# Patient Record
Sex: MALE | URBAN - METROPOLITAN AREA
[De-identification: names, ages, dates, MRNs, and addresses within clinical notes are randomized per-mention and may not be internally consistent; named-entity substitution may affect disease eponyms.]

---

## 2022-09-07 ENCOUNTER — HOME HEALTH ADMISSION (OUTPATIENT)
Dept: HOME HEALTH SERVICES | Facility: HOME HEALTH | Age: 87
End: 2022-09-07
Payer: MEDICARE

## 2022-09-19 ENCOUNTER — HOME CARE VISIT (OUTPATIENT)
Dept: SCHEDULING | Facility: HOME HEALTH | Age: 87
End: 2022-09-19
Payer: MEDICARE

## 2022-09-19 VITALS
SYSTOLIC BLOOD PRESSURE: 154 MMHG | TEMPERATURE: 97.6 F | HEART RATE: 82 BPM | RESPIRATION RATE: 18 BRPM | OXYGEN SATURATION: 96 % | DIASTOLIC BLOOD PRESSURE: 86 MMHG

## 2022-09-19 PROCEDURE — G0299 HHS/HOSPICE OF RN EA 15 MIN: HCPCS

## 2022-09-19 ASSESSMENT — ENCOUNTER SYMPTOMS
COUGH CHARACTERISTICS: NON-PRODUCTIVE
DYSPNEA ACTIVITY LEVEL: AFTER AMBULATING MORE THAN 20 FT
PAIN LOCATION - PAIN QUALITY: ACHES
COUGH: 1

## 2022-09-24 PROCEDURE — 400014 HH F/U

## 2022-09-24 PROCEDURE — 1090000001 HH PPS REVENUE CREDIT

## 2022-09-24 PROCEDURE — 1090000002 HH PPS REVENUE DEBIT

## 2022-09-25 PROCEDURE — 1090000002 HH PPS REVENUE DEBIT

## 2022-09-25 PROCEDURE — 1090000001 HH PPS REVENUE CREDIT

## 2022-09-26 PROCEDURE — 1090000002 HH PPS REVENUE DEBIT

## 2022-09-26 PROCEDURE — 1090000001 HH PPS REVENUE CREDIT

## 2022-09-27 PROCEDURE — 1090000001 HH PPS REVENUE CREDIT

## 2022-09-27 PROCEDURE — 1090000002 HH PPS REVENUE DEBIT

## 2022-09-28 ENCOUNTER — HOME CARE VISIT (OUTPATIENT)
Dept: HOME HEALTH SERVICES | Facility: HOME HEALTH | Age: 87
End: 2022-09-28
Payer: MEDICARE

## 2022-09-28 PROCEDURE — 1090000001 HH PPS REVENUE CREDIT

## 2022-09-28 PROCEDURE — 1090000002 HH PPS REVENUE DEBIT

## 2022-09-28 ASSESSMENT — ENCOUNTER SYMPTOMS: HEMOPTYSIS: 0

## 2022-09-29 PROCEDURE — 1090000001 HH PPS REVENUE CREDIT

## 2022-09-29 PROCEDURE — 1090000002 HH PPS REVENUE DEBIT

## 2022-09-30 PROCEDURE — 1090000002 HH PPS REVENUE DEBIT

## 2022-09-30 PROCEDURE — 1090000001 HH PPS REVENUE CREDIT

## 2022-10-01 PROCEDURE — 1090000001 HH PPS REVENUE CREDIT

## 2022-10-01 PROCEDURE — 1090000002 HH PPS REVENUE DEBIT

## 2022-10-02 PROCEDURE — 1090000002 HH PPS REVENUE DEBIT

## 2022-10-02 PROCEDURE — 1090000001 HH PPS REVENUE CREDIT

## 2022-10-03 PROCEDURE — 1090000001 HH PPS REVENUE CREDIT

## 2022-10-03 PROCEDURE — 1090000002 HH PPS REVENUE DEBIT

## 2022-10-04 PROCEDURE — 1090000001 HH PPS REVENUE CREDIT

## 2022-10-04 PROCEDURE — 1090000002 HH PPS REVENUE DEBIT

## 2022-10-05 PROCEDURE — 1090000002 HH PPS REVENUE DEBIT

## 2022-10-05 PROCEDURE — 1090000001 HH PPS REVENUE CREDIT

## 2022-10-06 PROCEDURE — 1090000001 HH PPS REVENUE CREDIT

## 2022-10-06 PROCEDURE — 1090000002 HH PPS REVENUE DEBIT

## 2022-10-07 PROCEDURE — 1090000001 HH PPS REVENUE CREDIT

## 2022-10-07 PROCEDURE — 1090000002 HH PPS REVENUE DEBIT

## 2022-10-08 PROCEDURE — 1090000001 HH PPS REVENUE CREDIT

## 2022-10-08 PROCEDURE — 1090000002 HH PPS REVENUE DEBIT

## 2022-10-09 PROCEDURE — 1090000001 HH PPS REVENUE CREDIT

## 2022-10-09 PROCEDURE — 1090000002 HH PPS REVENUE DEBIT

## 2022-10-10 PROCEDURE — 1090000001 HH PPS REVENUE CREDIT

## 2022-10-10 PROCEDURE — 1090000002 HH PPS REVENUE DEBIT

## 2022-10-11 PROCEDURE — 1090000001 HH PPS REVENUE CREDIT

## 2022-10-11 PROCEDURE — 1090000002 HH PPS REVENUE DEBIT

## 2022-10-12 PROCEDURE — 1090000001 HH PPS REVENUE CREDIT

## 2022-10-12 PROCEDURE — 1090000002 HH PPS REVENUE DEBIT

## 2022-10-13 PROCEDURE — 1090000001 HH PPS REVENUE CREDIT

## 2022-10-13 PROCEDURE — 1090000002 HH PPS REVENUE DEBIT

## 2022-10-14 PROCEDURE — 1090000002 HH PPS REVENUE DEBIT

## 2022-10-14 PROCEDURE — 1090000001 HH PPS REVENUE CREDIT

## 2022-10-15 PROCEDURE — 1090000001 HH PPS REVENUE CREDIT

## 2022-10-15 PROCEDURE — 1090000002 HH PPS REVENUE DEBIT

## 2022-10-16 PROCEDURE — 1090000001 HH PPS REVENUE CREDIT

## 2022-10-16 PROCEDURE — 1090000002 HH PPS REVENUE DEBIT

## 2022-10-17 PROCEDURE — 1090000002 HH PPS REVENUE DEBIT

## 2022-10-17 PROCEDURE — 1090000001 HH PPS REVENUE CREDIT

## 2022-10-18 ENCOUNTER — HOME CARE VISIT (OUTPATIENT)
Dept: SCHEDULING | Facility: HOME HEALTH | Age: 87
End: 2022-10-18
Payer: MEDICARE

## 2022-10-18 VITALS
TEMPERATURE: 97.8 F | RESPIRATION RATE: 20 BRPM | DIASTOLIC BLOOD PRESSURE: 86 MMHG | OXYGEN SATURATION: 92 % | HEART RATE: 72 BPM | SYSTOLIC BLOOD PRESSURE: 146 MMHG

## 2022-10-18 PROCEDURE — G0299 HHS/HOSPICE OF RN EA 15 MIN: HCPCS

## 2022-10-18 PROCEDURE — 1090000001 HH PPS REVENUE CREDIT

## 2022-10-18 PROCEDURE — 400014 HH F/U

## 2022-10-18 PROCEDURE — 1090000002 HH PPS REVENUE DEBIT

## 2022-10-18 ASSESSMENT — ENCOUNTER SYMPTOMS
CONSTIPATION: 1
DYSPNEA ACTIVITY LEVEL: AFTER AMBULATING MORE THAN 20 FT
COUGH CHARACTERISTICS: DRY
COUGH: 1

## 2022-10-18 NOTE — HOME HEALTH
Problem: Routine Urine Catheter change    Intervention: Catheter CDI, urine clear, yellow, below waist, no dependent loops, remined to empty less than 1/2 full, mcginnis care/new catheter per POC, tolerated well. Medications updated per [facility], had recent cough/dry non-productive, on Steroids, Nebs, lungs clear, VS/SATS WNL. Instructed on medications/disease management, safety/fall/infection/bleeding/Mcginnis precautions, CDB, and plan of care needs reinforcement. Goal: Patient will be safe at home free from falls/injury/infection, free from complications of mcginnis cath/bleeding, cough resolved.

## 2022-10-19 PROCEDURE — 1090000002 HH PPS REVENUE DEBIT

## 2022-10-19 PROCEDURE — 1090000001 HH PPS REVENUE CREDIT

## 2022-10-20 PROCEDURE — 1090000002 HH PPS REVENUE DEBIT

## 2022-10-20 PROCEDURE — 1090000001 HH PPS REVENUE CREDIT

## 2022-10-21 PROCEDURE — 1090000001 HH PPS REVENUE CREDIT

## 2022-10-21 PROCEDURE — 1090000002 HH PPS REVENUE DEBIT

## 2022-10-22 PROCEDURE — 1090000002 HH PPS REVENUE DEBIT

## 2022-10-22 PROCEDURE — 1090000001 HH PPS REVENUE CREDIT

## 2022-10-23 PROCEDURE — 1090000003 HH PPS REV ADJ

## 2022-10-23 PROCEDURE — 1090000001 HH PPS REVENUE CREDIT

## 2022-10-23 PROCEDURE — 1090000002 HH PPS REVENUE DEBIT

## 2022-11-15 ENCOUNTER — HOME CARE VISIT (OUTPATIENT)
Dept: HOME HEALTH SERVICES | Facility: HOME HEALTH | Age: 87
End: 2022-11-15
Payer: MEDICARE

## 2022-11-22 ENCOUNTER — HOME CARE VISIT (OUTPATIENT)
Dept: SCHEDULING | Facility: HOME HEALTH | Age: 87
End: 2022-11-22

## 2022-11-22 VITALS
HEART RATE: 79 BPM | SYSTOLIC BLOOD PRESSURE: 138 MMHG | DIASTOLIC BLOOD PRESSURE: 86 MMHG | OXYGEN SATURATION: 92 % | TEMPERATURE: 97.9 F | RESPIRATION RATE: 18 BRPM

## 2022-11-22 PROCEDURE — G0299 HHS/HOSPICE OF RN EA 15 MIN: HCPCS

## 2022-11-22 ASSESSMENT — ENCOUNTER SYMPTOMS: DYSPNEA ACTIVITY LEVEL: AFTER AMBULATING MORE THAN 20 FT

## 2022-11-22 NOTE — HOME HEALTH
Problem: Mcginnis catheter/Urine retention    Intervention: Patient seen for [place] [id number] SN recertification/Mcginnis change. Routine mcginnis change/assessment, unrine yellow/hazy/sediment, insertion site CDI, denies any discomfort or complications, Mcginnis changed in sterile fashion, for return for clear yellow urine, tolerated well, secured to right thigh, to leg back with straps, below waist, no dependent loops, instructed to empty less than 1/2 full, mcginnis care/infection prevention, needs reinforcement. VS/SATS WNL, lungs clear, at baseline, instructed on COPD/energy conservation, cough deep breathing, needs reinforcement. Instructed on medications/disease management, safety/fall/pressure injury/infection/bleeding/COPD precautions and plan of care, needs reinforcement. Goal: Patient will be safe at home free from falls/injury/infection, free from complications of bleeding/mcginnis catheter/COPD.

## 2022-11-30 ENCOUNTER — HOME CARE VISIT (OUTPATIENT)
Dept: HOME HEALTH SERVICES | Facility: HOME HEALTH | Age: 87
End: 2022-11-30

## 2022-11-30 PROCEDURE — G0299 HHS/HOSPICE OF RN EA 15 MIN: HCPCS

## 2022-12-01 VITALS
SYSTOLIC BLOOD PRESSURE: 110 MMHG | OXYGEN SATURATION: 92 % | DIASTOLIC BLOOD PRESSURE: 60 MMHG | RESPIRATION RATE: 18 BRPM | HEART RATE: 73 BPM | TEMPERATURE: 97.3 F

## 2022-12-01 ASSESSMENT — ENCOUNTER SYMPTOMS: DYSPNEA ACTIVITY LEVEL: AFTER AMBULATING MORE THAN 20 FT

## 2022-12-01 NOTE — HOME HEALTH
Problem: PRN visit for RLE wound assessment/Mcginnis catheter. RLE dry/red skin irritation from adhesive device, not open dry/flaky, moisturize skin/cover with bandage, no further interventions required. Intervention: [facility] SN routine visit, Mcginnis CDI, draining clear yellow urine, below waist, new leg strap securement to LLE, no dependent loops, reminded to empty less than 1/2 full, mcginnis care/infection prevention, needs reinforcement. Wound as noted above no further interventions needed, avoid adhesive R/T skin irritation, use leg strap for mcginnis securement. Instructed on medications/risk for bleeding/disease management, safety/fall/pressure injury/Mcginnis/bleeding precautions, Mcginnis care, and plan of care, needs reinforcement. Goal: Patient will be safe at home free from falls/injury/infection, Mcginnis intact without complications.

## 2022-12-20 ENCOUNTER — HOME CARE VISIT (OUTPATIENT)
Dept: SCHEDULING | Facility: HOME HEALTH | Age: 87
End: 2022-12-20
Payer: MEDICARE

## 2022-12-20 VITALS
OXYGEN SATURATION: 94 % | SYSTOLIC BLOOD PRESSURE: 102 MMHG | RESPIRATION RATE: 18 BRPM | DIASTOLIC BLOOD PRESSURE: 60 MMHG | HEART RATE: 68 BPM | TEMPERATURE: 97.6 F

## 2022-12-20 PROCEDURE — G0299 HHS/HOSPICE OF RN EA 15 MIN: HCPCS

## 2022-12-22 ENCOUNTER — HOME CARE VISIT (OUTPATIENT)
Dept: HOME HEALTH SERVICES | Facility: HOME HEALTH | Age: 87
End: 2022-12-22
Payer: MEDICARE

## 2022-12-22 ASSESSMENT — ENCOUNTER SYMPTOMS: DYSPNEA ACTIVITY LEVEL: AFTER AMBULATING MORE THAN 20 FT

## 2022-12-22 NOTE — HOME HEALTH
Problem: [facility] requested PRN visit for Leg bag change, Waggoner CDI, draining, clear yellow urine, secured to leg, below waist, no dependent loops, reminded to empty less than 1/2 full, free from S/S of infection. New open wound to RLE shin/calf, 9x3x0.1, pink/white base, moderate serous drainage, no redness, or heat, changed socks as they were wet. Intervention: [facility] SN wound care per POC tolerated well, instructed on wound care/infection prevention, diet to promote healing, needs reinforcement. Will begin SN visits for wound care 2x weekly/PRN. Waggoner intact WNL, leg bag changed, plan Waggoner change next week. VS/SATS WNL, voices no further complaints at this time. Goal: Patient will be safe at home free from falls/injury/infection, wound healed, free from complications of Waggoner catheter/risk for bleeding.

## 2022-12-23 ENCOUNTER — HOME CARE VISIT (OUTPATIENT)
Dept: SCHEDULING | Facility: HOME HEALTH | Age: 87
End: 2022-12-23
Payer: MEDICARE

## 2022-12-23 VITALS
RESPIRATION RATE: 16 BRPM | SYSTOLIC BLOOD PRESSURE: 126 MMHG | OXYGEN SATURATION: 97 % | HEART RATE: 64 BPM | DIASTOLIC BLOOD PRESSURE: 70 MMHG | TEMPERATURE: 98.1 F

## 2022-12-23 PROCEDURE — G0300 HHS/HOSPICE OF LPN EA 15 MIN: HCPCS

## 2022-12-23 ASSESSMENT — ENCOUNTER SYMPTOMS
HEMOPTYSIS: 0
BOWEL INCONTINENCE: 1

## 2022-12-23 NOTE — HOME HEALTH
Patient with wound, mobility and cognitive impairment   wound care provided per md order, patient toelrated well   Caregiver involvement: resides in [facility]   Patient education provided this visit: SN educated patient and patient's caregiver on fall prevention techniques and signs and symptoms of infection. Patient and patient caregiver verbalizes understanding via the teach back method.    Progress toward goals: progressing well client alert and oriented time 3, vital signs within normal limits, client denies pain, wound care was provided to right lower extremity,  scant serousanginous drainage noted, no signs or symptoms of infection, patient tolerated well, trace edema noted, mcginnis catheter patent, no issues reported mcginnis patent, lung sounds were clear to auscultation, no cough or shortness of breath was noted, fall precautions were maintained   Plan for next visit: wound care   discharge planning was discussed with the patient/ patient's caregiver: DC when goals met

## 2022-12-28 ENCOUNTER — HOME CARE VISIT (OUTPATIENT)
Dept: SCHEDULING | Facility: HOME HEALTH | Age: 87
End: 2022-12-28
Payer: MEDICARE

## 2022-12-28 VITALS
OXYGEN SATURATION: 97 % | SYSTOLIC BLOOD PRESSURE: 116 MMHG | DIASTOLIC BLOOD PRESSURE: 70 MMHG | HEART RATE: 66 BPM | TEMPERATURE: 98.7 F | RESPIRATION RATE: 16 BRPM

## 2022-12-28 PROCEDURE — G0300 HHS/HOSPICE OF LPN EA 15 MIN: HCPCS

## 2022-12-28 ASSESSMENT — ENCOUNTER SYMPTOMS
BOWEL INCONTINENCE: 1
HEMOPTYSIS: 0

## 2022-12-28 NOTE — HOME HEALTH
Patient with mcginnis, wound, mobility and cognitive impairment   wound care provided per md order, patient toelrated well   Caregiver involvement: resides in [facility]   Patient education provided this visit: SN educated patient and patient's caregiver on fall prevention techniques. Patient and patient caregiver verbalizes understanding via the teach back method.    Progress toward goals: progressing well client alert, vital signs within normal limits, client denies pain, mcginnis catheter changed per md order, patient toelrated mcginnis catheter change well, mcginnis patent clear yellow urine draining, wound care to right lower extremity provided, no drainage noted, measurments obtained, no signs or sympotms of infection,  no edema noted, lung sounds were clear to auscultation, no cough or shortness of breath was noted, fall precautions were maintained   Plan for next visit: wound care  discharge planning was discussed with the patient/ patient's caregiver: DC when goals met

## 2022-12-30 ENCOUNTER — HOME CARE VISIT (OUTPATIENT)
Dept: SCHEDULING | Facility: HOME HEALTH | Age: 87
End: 2022-12-30
Payer: MEDICARE

## 2022-12-30 VITALS
DIASTOLIC BLOOD PRESSURE: 70 MMHG | HEART RATE: 65 BPM | RESPIRATION RATE: 16 BRPM | TEMPERATURE: 97.7 F | SYSTOLIC BLOOD PRESSURE: 116 MMHG | OXYGEN SATURATION: 95 %

## 2022-12-30 PROCEDURE — G0300 HHS/HOSPICE OF LPN EA 15 MIN: HCPCS

## 2022-12-30 ASSESSMENT — ENCOUNTER SYMPTOMS
HEMOPTYSIS: 0
BOWEL INCONTINENCE: 1

## 2022-12-30 NOTE — HOME HEALTH
Patient with indwelling catheter, wound, mobility and cognitive impairment   wound healed open to air  Caregiver involvement: resides in correction   Patient education provided this visit: SN educated patient and patient's caregiver on fall prevention, safety maintanence, and maintaining skin integrity. Patient and patient caregiver verbalizes understanding via the teach back method.    Progress toward goals: progressing well client alert,  vital signs within normal limits, client denies pain, wound healed open to air, no drainage no signs or symptoms of infection, dry flakey skin noted, lotion applied, no edema noted, mcginnis catheter patent no complaints of discomfort, lung sounds were clear to auscultation, no cough or shortness of breath was noted, fall precautions were maintained   Plan for next visit: skin integrity check and mcginnis care  discharge planning was discussed with the patient/ patient's caregiver: DC when goals met

## 2023-01-03 ENCOUNTER — HOME CARE VISIT (OUTPATIENT)
Dept: SCHEDULING | Facility: HOME HEALTH | Age: 88
End: 2023-01-03
Payer: MEDICARE

## 2023-01-03 VITALS
HEART RATE: 61 BPM | RESPIRATION RATE: 16 BRPM | SYSTOLIC BLOOD PRESSURE: 120 MMHG | TEMPERATURE: 98 F | OXYGEN SATURATION: 93 % | DIASTOLIC BLOOD PRESSURE: 76 MMHG

## 2023-01-03 PROCEDURE — G0300 HHS/HOSPICE OF LPN EA 15 MIN: HCPCS

## 2023-01-03 ASSESSMENT — ENCOUNTER SYMPTOMS
BOWEL INCONTINENCE: 1
HEMOPTYSIS: 0

## 2023-01-03 NOTE — HOME HEALTH
Patient with mcginnis catheter, mobility and cognitive impairment   Caregiver involvement: resides in group home   Patient education provided this visit: SN educated patient and patient's caregiver on maintaining skin integrity. Patient and patient caregiver verbalizes understanding via the teach back method.    Progress toward goals: progressing well client alert and oriented time 3, vital signs within normal limits, client denies pain, skin remains intact, no drainage noted, no edema noted, mcginnis catheter patent draining clear yellow none-odorous urine,  lung sounds were clear to auscultation, no cough or shortness of breath was noted, fall precautions were maintained   Plan for next visit: skin integrity and mcginnis management   discharge planning was discussed with the patient/ patient's caregiver: DC when goals met

## 2023-01-06 ENCOUNTER — HOME CARE VISIT (OUTPATIENT)
Dept: SCHEDULING | Facility: HOME HEALTH | Age: 88
End: 2023-01-06
Payer: MEDICARE

## 2023-01-06 VITALS
TEMPERATURE: 97.9 F | RESPIRATION RATE: 16 BRPM | DIASTOLIC BLOOD PRESSURE: 70 MMHG | OXYGEN SATURATION: 96 % | HEART RATE: 60 BPM | SYSTOLIC BLOOD PRESSURE: 100 MMHG

## 2023-01-06 PROCEDURE — G0300 HHS/HOSPICE OF LPN EA 15 MIN: HCPCS

## 2023-01-06 ASSESSMENT — ENCOUNTER SYMPTOMS
BOWEL INCONTINENCE: 1
HEMOPTYSIS: 0

## 2023-01-06 NOTE — HOME HEALTH
Patient with mcginnis catheter, recently healed wound, mobility and cognitive impairment   Caregiver involvement: resides in [facility] memory care unit  Patient education provided this visit: SN educated patient and patient's caregiver on pain management techniques and reporting  Patient and patient caregiver verbalizes understanding via the teach back method.    Progress toward goals: progressing well client alert and oriented to person, vital signs within normal limits, client denies pain, skin  remains intact from head to toe wound healed no drainaeg noted, no edema noted,  mcginnis catheter patent clear yellow none odorous urine draining, no mcginnis complaints from patient, lung sounds were clear to auscultation, no cough or shortness of breath was noted, fall precautions were maintained   Plan for next visit: mcginnis maintainence  discharge planning was discussed with the patient/ patient's caregiver: DC when goals met

## 2023-01-10 ENCOUNTER — HOME CARE VISIT (OUTPATIENT)
Dept: SCHEDULING | Facility: HOME HEALTH | Age: 88
End: 2023-01-10
Payer: MEDICARE

## 2023-01-10 PROCEDURE — G0299 HHS/HOSPICE OF RN EA 15 MIN: HCPCS

## 2023-01-11 VITALS
SYSTOLIC BLOOD PRESSURE: 146 MMHG | HEART RATE: 88 BPM | DIASTOLIC BLOOD PRESSURE: 86 MMHG | RESPIRATION RATE: 18 BRPM | OXYGEN SATURATION: 98 %

## 2023-01-11 ASSESSMENT — ENCOUNTER SYMPTOMS
DIARRHEA: 1
STOOL DESCRIPTION: DIARRHEA

## 2023-01-11 NOTE — HOME HEALTH
Replaced Waggoner catheter that pt. self-discontinued with 30cc balloon intact. Pt. noted to have several blood clots in and around tip of penis that were expressed with placement of new catheter. Pt. noted to be on Coumadin 8mg po Q daily. Dr. Kaye Chong office nurse, Mackenzie Murray notified who repeated back writer's message and writer's phone number.

## 2023-01-13 ENCOUNTER — HOME CARE VISIT (OUTPATIENT)
Dept: SCHEDULING | Facility: HOME HEALTH | Age: 88
End: 2023-01-13
Payer: MEDICARE

## 2023-01-13 PROCEDURE — G0299 HHS/HOSPICE OF RN EA 15 MIN: HCPCS

## 2023-01-14 VITALS
SYSTOLIC BLOOD PRESSURE: 116 MMHG | DIASTOLIC BLOOD PRESSURE: 80 MMHG | HEART RATE: 67 BPM | RESPIRATION RATE: 20 BRPM | OXYGEN SATURATION: 91 %

## 2023-01-14 ASSESSMENT — ENCOUNTER SYMPTOMS: BOWEL INCONTINENCE: 1

## 2023-01-14 NOTE — HOME HEALTH
P: Urinary retention      I: Newest Waggoner catheter placed on 1/10/23 with scant clear yellow urine. Waggoner is to leg bag abd is secured with two elastic leg bag straps, with the ability to place two fingers underneath to check for good fitting. Pt. sitting on couch in his room watch golf on TV at start of [facility] visit. Respiratory assessment revealed musical rales in bilat posterior bases. O2 sat 86% on RA. Educated pt to cough and deep breathe with demonstration provided for the patient. Pt. does return demonstration fair. O2 sat gradually increased to 91%-92%. Pt./ [facility] staff aides deny any recent episodes of aspiration. [facility] nurse made aware who reports that she will contact he pt's physician. P: Continue to monitor Waggoner catheter and O2 sat/ lung sounds each visit 2 x weekly. Plan for recert next week.

## 2023-01-17 ENCOUNTER — HOME CARE VISIT (OUTPATIENT)
Dept: HOME HEALTH SERVICES | Facility: HOME HEALTH | Age: 88
End: 2023-01-17

## 2023-01-17 PROCEDURE — G0299 HHS/HOSPICE OF RN EA 15 MIN: HCPCS

## 2023-01-19 VITALS
OXYGEN SATURATION: 95 % | HEART RATE: 62 BPM | SYSTOLIC BLOOD PRESSURE: 156 MMHG | DIASTOLIC BLOOD PRESSURE: 60 MMHG | RESPIRATION RATE: 18 BRPM | TEMPERATURE: 97.4 F

## 2023-01-19 ASSESSMENT — ENCOUNTER SYMPTOMS: BOWEL INCONTINENCE: 1

## 2023-01-20 NOTE — HOME HEALTH
Patient with continued need for skilled observation, assessment, and management of indwelling urinary catheter device. Mcginnis catheter was lst changed on 1/10/23 and is functioning well with quantity sufficient clear yellow urine output. Patient denies pain or discomfort at catheter site. Caregiver involvement: Pt. continues to reside in [facility] and reuires minimum to moderate assistance with ADLs  Medications reconciled and all medications are available in [facility] medication room. Home health supplies by type and quantity ordered/delivered this visit include: Order for new 18 Uzbek, 30cc balloon mcginnis catheters, catheter start kits and change kits, and leg bags   Patient education provided this visit: SN educated patient and patient's caregiver on increased hydration to keep optimal urine output and kidney function. Patient  verbalizes understanding via the teach back method. Progress toward goals: progressing fairly well. Home exercise program: Ambulate with assistance for longer household distances, such as to the dining room and back three times a day. Plan for next visit: Safety and falls prevention. SN 1W 9 to monitor and manage mcginnis catheter (next urinary catheter change due: 2/10/23). The following discharge planning was discussed with the patientr: DC when goals are met.

## 2023-01-26 ENCOUNTER — HOME CARE VISIT (OUTPATIENT)
Dept: SCHEDULING | Facility: HOME HEALTH | Age: 88
End: 2023-01-26

## 2023-01-26 VITALS
TEMPERATURE: 97.3 F | OXYGEN SATURATION: 96 % | HEART RATE: 64 BPM | DIASTOLIC BLOOD PRESSURE: 82 MMHG | RESPIRATION RATE: 20 BRPM | SYSTOLIC BLOOD PRESSURE: 156 MMHG

## 2023-01-26 PROCEDURE — G0299 HHS/HOSPICE OF RN EA 15 MIN: HCPCS

## 2023-01-29 ASSESSMENT — ENCOUNTER SYMPTOMS
BOWEL INCONTINENCE: 1
STOOL DESCRIPTION: SOFT

## 2023-01-29 NOTE — HOME HEALTH
Patient with continued need for skilled observation, assessment, and management of indwelling urinary catheter device. Mcginnis catheter was lst changed on 1/10/23 and is functioning well with quantity sufficient clear yellow urine output. Patient denies pain or discomfort at catheter site. Catheter care perform and catheter bag emptied for 200 cc clear yellow urine. Assisted pt. with incontinent stool clean up, lower body dressing, and walked patient with CGA to the dining room for lunch. Pt. with short stride and shuffling gait. Instructed pt. to lean head slightly forward with ambulation and to call [facility] staff for assistance for ambulation to BR and common areas. Pt. nods head and acknowledges understanding. Medications reconciled and all medications are available in [facility] medication room. Patient education provided this visit: SN educated patient on safety with transfers and ambulation. Patient nods head with understanding. Progress toward goals: progressing fairly well. Home exercise program: Ambulate with assistance for longer household distances, such as to the dining room and back three times a day. Plan for next visit: Safety and falls prevention.  1W 9 to monitor and manage mcginnis catheter (next urinary catheter change due: 2/10/23). The following discharge planning was discussed with the patientr: DC when goals are met.

## 2023-01-31 ENCOUNTER — HOME CARE VISIT (OUTPATIENT)
Dept: SCHEDULING | Facility: HOME HEALTH | Age: 88
End: 2023-01-31

## 2023-01-31 VITALS
RESPIRATION RATE: 20 BRPM | HEART RATE: 67 BPM | OXYGEN SATURATION: 97 % | SYSTOLIC BLOOD PRESSURE: 120 MMHG | TEMPERATURE: 96.8 F | DIASTOLIC BLOOD PRESSURE: 70 MMHG

## 2023-01-31 PROCEDURE — G0299 HHS/HOSPICE OF RN EA 15 MIN: HCPCS

## 2023-01-31 ASSESSMENT — ENCOUNTER SYMPTOMS: BOWEL INCONTINENCE: 1

## 2023-02-01 NOTE — HOME HEALTH
Patient was seen by Roddy GUTIERREZ for skilled observation, assessment, and management of indwelling urinary catheter device. Patient was found in common dining room finishing lunch upon arrival. Assisted patient with arm in arm ambulation back to his room. Mcginnis catheter assessed and in noted to be functioning well with dark clear yellow urine noted in leg bag. Patient denies pain or discomfort at catheter site. Catheter care performed, Catheter bag emptied for 125 cc clear dark yellow non-malodorous urine. Leg bag changed and fastenen to patient's RLE with two leg bag straps that two fingers fit comfortably underneath. VSS. Pt noted to have right sided wheezes and scattered   crackles at bilat bases. Pt. educated to perform coughing and deep breathing exercises via demonstration. r. Pt. returns demonstration of coughing and deep breathing exercises appropriately and LS more clear afterwards. O 2 sat increased to 97% from 90% on room air after coughing and deep breathing. Patient's medications list reviewed with no new meications noted since last SN visit. SN to [id number] to monitor and manage mcginnis catheter (next urinary catheter change due: 2/10/23).    Joyce Moraes

## 2023-02-09 ENCOUNTER — HOME CARE VISIT (OUTPATIENT)
Dept: SCHEDULING | Facility: HOME HEALTH | Age: 88
End: 2023-02-09
Payer: MEDICARE

## 2023-02-09 PROCEDURE — G0299 HHS/HOSPICE OF RN EA 15 MIN: HCPCS

## 2023-02-12 VITALS
OXYGEN SATURATION: 93 % | SYSTOLIC BLOOD PRESSURE: 136 MMHG | RESPIRATION RATE: 18 BRPM | DIASTOLIC BLOOD PRESSURE: 80 MMHG | HEART RATE: 64 BPM | TEMPERATURE: 96.5 F

## 2023-02-12 NOTE — HOME HEALTH
Problem: Chronic Waggoner catheter for urinary retention    Intervention: Monthly routine catheter change performed per physician order. Old 18 Cape Verdean, 30ml balloon  Waggoner catheter discontinued without adverse incident. New 18 Cape Verdean, 30 ml Waggoner catheter inserted via sterile procedure with immediate return of clear yellow urine and catheter anchored with adhesive stat lock to pt's right inner thigh. Pt. tolerated well. Routine catheter care performed. Catheter attatched to new leg bag with  atheter straps x 2. Goal: Pt. will remain free of urinary retention and s/s of local and systemic infection.

## 2023-02-16 ENCOUNTER — HOME CARE VISIT (OUTPATIENT)
Dept: SCHEDULING | Facility: HOME HEALTH | Age: 88
End: 2023-02-16
Payer: MEDICARE

## 2023-02-16 PROCEDURE — G0299 HHS/HOSPICE OF RN EA 15 MIN: HCPCS

## 2023-02-18 VITALS
HEART RATE: 64 BPM | DIASTOLIC BLOOD PRESSURE: 80 MMHG | RESPIRATION RATE: 18 BRPM | SYSTOLIC BLOOD PRESSURE: 140 MMHG | OXYGEN SATURATION: 95 % | TEMPERATURE: 97.6 F

## 2023-02-18 NOTE — HOME HEALTH
Pt. seen in his [facility] room lying in bed, sleepy but arousable after breakfast. Assessment benign. Weekly routine catheter maintenance performed: cleansed Waggoner catheter insertion site with soap and water. Catheter is patent draining quantity sufficient clear yellow urine. Leg bag changed and re-scured to RLE with two new leg straps. Pt. tolerated well. Pt. later seen in common area watching TV, smiling and laughing with the other residents. Meication list reviewed. No new medications noted this week.

## 2023-02-20 ENCOUNTER — HOME CARE VISIT (OUTPATIENT)
Dept: SCHEDULING | Facility: HOME HEALTH | Age: 88
End: 2023-02-20
Payer: MEDICARE

## 2023-02-20 VITALS
RESPIRATION RATE: 18 BRPM | DIASTOLIC BLOOD PRESSURE: 70 MMHG | SYSTOLIC BLOOD PRESSURE: 140 MMHG | HEART RATE: 57 BPM | TEMPERATURE: 96.7 F | OXYGEN SATURATION: 96 %

## 2023-02-20 PROCEDURE — G0299 HHS/HOSPICE OF RN EA 15 MIN: HCPCS

## 2023-02-20 ASSESSMENT — ENCOUNTER SYMPTOMS: BOWEL INCONTINENCE: 1

## 2023-02-21 NOTE — HOME HEALTH
Pt. found in common living area napping after breakfast. Pt awakens to voice. Pt. recognizes writer and agrees to accompany writer back to hisroom for care. Pt. stands with CGA and no ad. Pt. ambulates with arm in arm assistance back to his room. VSS. LS are noted to have expiratory wheezes and crackles at bilat posterior bases. O2 saturation is initially 86% on room air. Instructed pt to model writer's coughing and deep breathing instructions. Pt. does return demonstration sucessfully x 3. O2 saturation increases to 96%. Routine Waggoner catheter care performed with cleansing cather insertion site with soap and water. New stat lock catheter anchor replaced. Pt. tolerated well. Educated [facility] staff PCT to encourge pt./remind patient to cough and deep breath several times a day.

## 2023-02-28 ENCOUNTER — HOME CARE VISIT (OUTPATIENT)
Dept: SCHEDULING | Facility: HOME HEALTH | Age: 88
End: 2023-02-28
Payer: MEDICARE

## 2023-02-28 PROCEDURE — G0299 HHS/HOSPICE OF RN EA 15 MIN: HCPCS

## 2023-03-01 VITALS
TEMPERATURE: 97.4 F | DIASTOLIC BLOOD PRESSURE: 76 MMHG | RESPIRATION RATE: 16 BRPM | OXYGEN SATURATION: 95 % | HEART RATE: 64 BPM | SYSTOLIC BLOOD PRESSURE: 128 MMHG

## 2023-03-01 ASSESSMENT — ENCOUNTER SYMPTOMS
BOWEL INCONTINENCE: 1
STOOL DESCRIPTION: LOOSE

## 2023-03-01 NOTE — HOME HEALTH
Pt. found walking back from common living area with [facility] staff breakfast. Pt. recognizes and smiles and  points to writer and agrees to accompany writer back to hisroom for care. o his room. VSS. LS are noted to have expiratory wheezes and crackles at bilat posterior bases. O2 saturation is initially 86% on room air. Instructed pt to model writer's coughing and deep breathing instructions. Pt. does return demonstration sucessfully x 3. O2 saturation increases to 95% on RA Routine Waggoner catheter care performed with cleansing cather insertion site with soap and water. Leg bag changed and stat lock catheter anchor is intact. Pt. tolerated well. Educated [facility] staff PCT to encourge pt./remind patient to cough and deep breath several times a day.

## 2023-03-06 ENCOUNTER — HOME CARE VISIT (OUTPATIENT)
Dept: SCHEDULING | Facility: HOME HEALTH | Age: 88
End: 2023-03-06
Payer: MEDICARE

## 2023-03-06 ENCOUNTER — HOME CARE VISIT (OUTPATIENT)
Dept: HOME HEALTH SERVICES | Facility: HOME HEALTH | Age: 88
End: 2023-03-06
Payer: MEDICARE

## 2023-03-06 PROCEDURE — G0299 HHS/HOSPICE OF RN EA 15 MIN: HCPCS

## 2023-03-07 VITALS
TEMPERATURE: 96.7 F | HEART RATE: 83 BPM | DIASTOLIC BLOOD PRESSURE: 62 MMHG | OXYGEN SATURATION: 93 % | SYSTOLIC BLOOD PRESSURE: 102 MMHG | RESPIRATION RATE: 18 BRPM

## 2023-03-07 ASSESSMENT — ENCOUNTER SYMPTOMS
COUGH CHARACTERISTICS: NON-PRODUCTIVE
COUGH: 1
BOWEL INCONTINENCE: 1

## 2023-03-07 NOTE — HOME HEALTH
Problem: Pt. found sitting outside on back porch of snf in the shade. Pt was hypotensive (initial BP 76/42,pulse 75 (RUE) and 80/50, pulse 81 (LUE) Pt. asymptomatic. Interventions:  Pt. assisted to wheelchair by writer and HHA. Pt. was given three 4oz cups of water to drink. Repeat /62. Dr. Radha Major notified. [facility] for arranging transporttion to ER for IVF. Goal: Pt. will be re-hydrated and BP will return to WNL n next 24 hours.

## 2023-03-13 ENCOUNTER — HOME CARE VISIT (OUTPATIENT)
Dept: SCHEDULING | Facility: HOME HEALTH | Age: 88
End: 2023-03-13
Payer: MEDICARE

## 2023-03-13 VITALS
TEMPERATURE: 97.1 F | HEART RATE: 70 BPM | SYSTOLIC BLOOD PRESSURE: 126 MMHG | DIASTOLIC BLOOD PRESSURE: 64 MMHG | OXYGEN SATURATION: 92 % | RESPIRATION RATE: 18 BRPM

## 2023-03-13 PROCEDURE — G0299 HHS/HOSPICE OF RN EA 15 MIN: HCPCS

## 2023-03-13 ASSESSMENT — ENCOUNTER SYMPTOMS: BOWEL INCONTINENCE: 1

## 2023-03-14 NOTE — HOME HEALTH
Problem: Chronic Waggoner catheter management for urinary retention    Interventions: Pt. was found sitting up on a couch in a common patient area napping . Pt. was assisted by writer with CGA to min assist back to his own room. VSS/WNL. Assessment benign. Current Waggoner catheter emptied for 225 cc clear dark yellow urine. Remove patient's adhesive stat lock anchor device using alcohol pads. Discontinued current Waggoner catheter without any traumatic or adverse incidence and removed current leg bag. Patient tolerated well. New 18 Turkish, 30 cc baloon 2-way Waggoner catheter  was inserted with one attempt using sterile procedure with return of 10 cc clear yellow, non-malodorous urine and was attached to new leg bag with two new elastic straps and secured to pt's RLE. New stat lock catheter ancor placed on patient's right upper, inner thigh. Pt tolerated well. Goals: Pt will remain free of s/s of UTI as chronic urinary catheter receives weekly catheter care.

## 2023-03-20 ENCOUNTER — HOME CARE VISIT (OUTPATIENT)
Dept: HOME HEALTH SERVICES | Facility: HOME HEALTH | Age: 88
End: 2023-03-20

## 2023-03-20 VITALS
RESPIRATION RATE: 18 BRPM | HEART RATE: 68 BPM | TEMPERATURE: 97.4 F | DIASTOLIC BLOOD PRESSURE: 70 MMHG | SYSTOLIC BLOOD PRESSURE: 136 MMHG | OXYGEN SATURATION: 96 %

## 2023-03-20 PROCEDURE — G0299 HHS/HOSPICE OF RN EA 15 MIN: HCPCS

## 2023-03-20 ASSESSMENT — ENCOUNTER SYMPTOMS: BOWEL INCONTINENCE: 1

## 2023-03-21 NOTE — HOME HEALTH
Patient with chronic mcginnis catheter for urinary retention. POC approval received from PCP. Routine Mcginnis catheter care provided per care plan. Pt tolerated well. Caregiver involvement: 24/7 assistance from [facility] staff and support from spouse who visits. Medications reconciled and all medications are available. Patient education provided this visit: SN educated patient and patient's caregiver on coughing and deep breathing to prevent pneumonia/atelectasis. Patient and patient caregiver verbalizes understanding via the teach back method. Progress toward goals: progressing well    Home exercise program: Passive ROM     Plan for next visit: Routine Mcginnis catheter maintenance.     The following discharge planning was discussed with the patient/ patient's caregiver: DC when goals met

## 2023-03-27 ENCOUNTER — HOME CARE VISIT (OUTPATIENT)
Dept: SCHEDULING | Facility: HOME HEALTH | Age: 88
End: 2023-03-27
Payer: MEDICARE

## 2023-03-27 VITALS
OXYGEN SATURATION: 95 % | SYSTOLIC BLOOD PRESSURE: 132 MMHG | RESPIRATION RATE: 16 BRPM | TEMPERATURE: 97 F | DIASTOLIC BLOOD PRESSURE: 66 MMHG | HEART RATE: 68 BPM

## 2023-03-27 PROCEDURE — G0299 HHS/HOSPICE OF RN EA 15 MIN: HCPCS

## 2023-03-27 ASSESSMENT — ENCOUNTER SYMPTOMS: BOWEL INCONTINENCE: 1

## 2023-03-27 NOTE — HOME HEALTH
Problem: Chronic Waggoner catheter management for urinary retention   Interventions: Pt. was found sitting up on a couch iother [facility] residents. napping . Pt. was assisted by writer with CGA to min assist back to his own room. VSS/WNL. Assessment was benign with the exception of pt's chronic expiratory wheezes bilaterally in all lung fields and fine crackles at RLL. Pt. with NAD. Coughing and deep breathing exercises performed O2 sat. 95% on RA after cough and deep breating performed. Routine Wgagoner catheter care performed. Catheter leg  bad was replaced and was attached pt's RLE with two new elastic straps. Pt tolerated well. Goals: Waggoner catheter will remain patent . Pt will remain free of s/s of UTI as chronic urinary catheter receives weekly catheter care.

## 2023-04-04 ENCOUNTER — HOME CARE VISIT (OUTPATIENT)
Dept: SCHEDULING | Facility: HOME HEALTH | Age: 88
End: 2023-04-04
Payer: MEDICARE

## 2023-04-04 VITALS
SYSTOLIC BLOOD PRESSURE: 136 MMHG | TEMPERATURE: 97.7 F | HEART RATE: 56 BPM | RESPIRATION RATE: 18 BRPM | OXYGEN SATURATION: 94 % | DIASTOLIC BLOOD PRESSURE: 80 MMHG

## 2023-04-04 PROCEDURE — G0299 HHS/HOSPICE OF RN EA 15 MIN: HCPCS

## 2023-04-04 ASSESSMENT — ENCOUNTER SYMPTOMS
BOWEL INCONTINENCE: 1
COUGH: 1

## 2023-04-04 NOTE — HOME HEALTH
Problem: Chronic Waggoner catheter 2/2 urinary retention. Intervention: Waggoner Catheter leg bag emptied of 325cc hazy yellow, non-malodorous urine. Waggoner catheter irrigated with 60 ml normal saline. Pt. tolerated well. Leg bag with two new leg straps and new stat lock catheter securing device. Waggoner catheter insertion site cleansed with soap and water. Pt. noted to have a right groin abrasion in his gight groin area near the Waggoner stat lock  is placed on the right upper, inner thigh. Wound cleansed with nomal saline and measured 0.7 cm in diameter, superficial. Xeroform gauze placed and covered with 3x3 foam bordered dressing. Pt. tolerated well. Goal: Pt's catheter will remain intact,patent and free from infection throughout episode of care. Pt's right groin abrasion will be fully healed in 2 weks or less.

## 2023-04-19 ENCOUNTER — HOME CARE VISIT (OUTPATIENT)
Dept: SCHEDULING | Facility: HOME HEALTH | Age: 88
End: 2023-04-19
Payer: MEDICARE

## 2023-04-19 PROCEDURE — G0299 HHS/HOSPICE OF RN EA 15 MIN: HCPCS

## 2023-04-21 ENCOUNTER — HOME CARE VISIT (OUTPATIENT)
Dept: HOME HEALTH SERVICES | Facility: HOME HEALTH | Age: 88
End: 2023-04-21
Payer: MEDICARE

## 2023-04-21 VITALS
OXYGEN SATURATION: 94 % | RESPIRATION RATE: 18 BRPM | HEART RATE: 70 BPM | TEMPERATURE: 97 F | SYSTOLIC BLOOD PRESSURE: 124 MMHG | DIASTOLIC BLOOD PRESSURE: 70 MMHG

## 2023-04-21 ASSESSMENT — ENCOUNTER SYMPTOMS
BOWEL INCONTINENCE: 1
DYSPNEA ACTIVITY LEVEL: AFTER AMBULATING 10 - 20 FT

## 2023-04-21 NOTE — HOME HEALTH
Problem: Chronic Mcginnis catheter 2/2 urinary retention, acute UTI, acute URI      Interventions: Routine weekly mcginnis catheter care performed: Mcginnis catheter irrigated with normal saline 50cc.with immediate return of clear yellow, non-malodorous urine. Pt. tolerated well. Old leg bag and leg straps were replaced with a new leg bag/straps via sterile protocol, and anchored with a new stat lock. Pt. tolerated well. Pt. with current UTI and URI and has been prescribed po abx and an oral steroid. Pt.'s LS are rhonchorous and wheezy bilaterally. O2 sat was initially 89% on RA on SN arrival. Coughing and deep breathing exercises demonstrated for pt. Pt. does return demonstration, but does not understand that he should expectorate what he is able cough up from his lungs. O2 sat increased to 94% on RA. Medication list was reviewed and new po ABX/steroid were added to list.     Goals: Pt. will be free of s/s of UTI and URI within the next two weeks.

## 2023-04-25 ENCOUNTER — HOME CARE VISIT (OUTPATIENT)
Dept: SCHEDULING | Facility: HOME HEALTH | Age: 88
End: 2023-04-25
Payer: MEDICARE

## 2023-04-25 PROCEDURE — G0299 HHS/HOSPICE OF RN EA 15 MIN: HCPCS

## 2023-04-26 VITALS
DIASTOLIC BLOOD PRESSURE: 64 MMHG | SYSTOLIC BLOOD PRESSURE: 116 MMHG | HEART RATE: 90 BPM | TEMPERATURE: 97.8 F | OXYGEN SATURATION: 97 % | RESPIRATION RATE: 22 BRPM

## 2023-04-26 ASSESSMENT — ENCOUNTER SYMPTOMS
DYSPNEA ACTIVITY LEVEL: AFTER AMBULATING LESS THAN 10 FT
STOOL DESCRIPTION: SOFT
BOWEL INCONTINENCE: 1

## 2023-04-26 NOTE — HOME HEALTH
Problem: Chronic Waggoner catheter 2/2 urinary retention, acute UTI, acute URI   Interventions: Pt. noted to be very unstable with ambulation today and had to be assisted by 2 PCT to return to his [facility] bedroom from main area for SN to perform routine catheter care. Pt. denies current dizziness stating, \"not today\". [facility] staff states that pt. has had some dizziness in the last few days. Pt's gait is ataxic like today. Pt. was instructed to NOT abulate alone as he would most likely fall and injure himself. Pt. repeats back understanding and has been historically non-impulsive. Current leg bag emptied for 50 cc clear yellow, non-malodorous urine. Leg bag and leg straps were replaced via sterile protocol, and anchored with a new stat lock. Pt. tolerated well. Pt. continues po abx for current UTI and URI. Pt.'s LS with crackles at bilateral posterior bases. O2 sat was 97% on RA. Marlyn Sterling Coughing and deep breathing exercises demonstrated for pt. Pt. does return demonstration. SN reported off to PCT that pt. was left in his [facility] bedroom sitting on his couch watching tv with his bedroom door left open. Goals: Pt. will be free of s/s of UTI and URI within the next two weeks.

## 2023-05-02 ENCOUNTER — HOME CARE VISIT (OUTPATIENT)
Dept: HOME HEALTH SERVICES | Facility: HOME HEALTH | Age: 88
End: 2023-05-02
Payer: MEDICARE

## 2023-05-02 PROCEDURE — G0300 HHS/HOSPICE OF LPN EA 15 MIN: HCPCS

## 2023-05-08 ENCOUNTER — HOME CARE VISIT (OUTPATIENT)
Dept: SCHEDULING | Facility: HOME HEALTH | Age: 88
End: 2023-05-08
Payer: MEDICARE

## 2023-05-08 PROCEDURE — G0299 HHS/HOSPICE OF RN EA 15 MIN: HCPCS

## 2023-05-11 VITALS
RESPIRATION RATE: 16 BRPM | SYSTOLIC BLOOD PRESSURE: 100 MMHG | TEMPERATURE: 98.2 F | DIASTOLIC BLOOD PRESSURE: 54 MMHG | OXYGEN SATURATION: 92 % | HEART RATE: 60 BPM

## 2023-05-11 ASSESSMENT — ENCOUNTER SYMPTOMS
DYSPNEA ACTIVITY LEVEL: AFTER AMBULATING 10 - 20 FT
STOOL DESCRIPTION: UNABLE TO SPECIFY

## 2023-05-11 NOTE — HOME HEALTH
Urinary retention requiring 18FR/30cc mcginnis cath, recent UTI/URI. Pt in his room today watching TV pleasantly confused, Poditry is present for an appt today, Mcginnis cath is not due to be changed today cath is patent and draining clear phil urine to leg bag., Medications are managed by staff and no recent changes have been made. Pt skin D/W/I. Lungs CTA. no reported cough. Denied pain. Pt educated on increase fluids to prevent dehydration and  UTI teach back done. CG taught to encourage fluids whe pt is out in commuinity. Goal: Mcginnis cath will remain patent and free of infection. DC No dc palnning begun d/t long term cath cares required.

## 2023-05-15 ENCOUNTER — HOME CARE VISIT (OUTPATIENT)
Dept: SCHEDULING | Facility: HOME HEALTH | Age: 88
End: 2023-05-15
Payer: MEDICARE

## 2023-05-15 VITALS
SYSTOLIC BLOOD PRESSURE: 120 MMHG | TEMPERATURE: 96.9 F | RESPIRATION RATE: 18 BRPM | DIASTOLIC BLOOD PRESSURE: 78 MMHG | OXYGEN SATURATION: 95 % | HEART RATE: 71 BPM

## 2023-05-15 PROCEDURE — G0299 HHS/HOSPICE OF RN EA 15 MIN: HCPCS

## 2023-05-15 ASSESSMENT — ENCOUNTER SYMPTOMS: BOWEL INCONTINENCE: 1

## 2023-05-15 NOTE — HOME HEALTH
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 found in common area of long term upon [facility] SN arrival. Assisted pt. with CGA from sit to stand and min assist with ambulation back to pt's room. Current mcginnis catheter leg bag emptied for 150 cc hazy yellow, non-malodorous urine. A  new 18 Uzbek, 30 ml balloon 2-way mcginnis catheter was inserted with one attempt andattached to a new leg bag with two new strap to RLE. Mcginnis catheter further secured with placement of  new Stat-Lock to right thigh. Pt. tolerated well. [facility] staff PCT informed of mcginnis catheter change. PCT repeated back understanding. Goals: Pt.'s mcginnis catheter will remain patent and pt. will be free of s/s of UTI .

## 2023-05-16 ENCOUNTER — HOME CARE VISIT (OUTPATIENT)
Dept: HOME HEALTH SERVICES | Facility: HOME HEALTH | Age: 88
End: 2023-05-16
Payer: MEDICARE

## 2023-05-18 ENCOUNTER — HOME CARE VISIT (OUTPATIENT)
Dept: HOME HEALTH SERVICES | Facility: HOME HEALTH | Age: 88
End: 2023-05-18

## 2023-05-18 ENCOUNTER — HOME CARE VISIT (OUTPATIENT)
Dept: HOME HEALTH SERVICES | Facility: HOME HEALTH | Age: 88
End: 2023-05-18
Payer: MEDICARE

## 2023-05-18 VITALS
OXYGEN SATURATION: 93 % | TEMPERATURE: 97.1 F | SYSTOLIC BLOOD PRESSURE: 150 MMHG | RESPIRATION RATE: 18 BRPM | DIASTOLIC BLOOD PRESSURE: 80 MMHG | HEART RATE: 80 BPM

## 2023-05-18 PROCEDURE — G0299 HHS/HOSPICE OF RN EA 15 MIN: HCPCS

## 2023-05-18 NOTE — HOME HEALTH
Patient with mcginnis catheter, mobility, and cognitive impairment  Skin CDI, catheter care Provided per care plan. VSS, lungs cta, pt denies pain. Mcginnis patent draining clear yellow non-odorous urine, Leg bag changed 150ml output. Pt tolerated well. Caregiver involvement: pt lives in [facility]  Medications reconciled and all medications are available in home. Home health supplies by type and quantity ordered/delivered this visit include: n/a  Patient education provided this visit: SN educated patient and patient's caregiver on .  Patient and patient caregiver verbalizes understanding via the teach back method. Progress toward goals: progressing well  Home exercise program:   Plan for next visit: mcginnis catheter management  The following discharge planning was discussed with the patient/ patient's caregiver: DC when goals met.

## 2023-05-22 ENCOUNTER — HOME CARE VISIT (OUTPATIENT)
Dept: SCHEDULING | Facility: HOME HEALTH | Age: 88
End: 2023-05-22

## 2023-05-22 VITALS
SYSTOLIC BLOOD PRESSURE: 130 MMHG | DIASTOLIC BLOOD PRESSURE: 84 MMHG | HEART RATE: 72 BPM | RESPIRATION RATE: 20 BRPM | OXYGEN SATURATION: 93 % | TEMPERATURE: 96.9 F

## 2023-05-22 VITALS
DIASTOLIC BLOOD PRESSURE: 90 MMHG | TEMPERATURE: 96.6 F | OXYGEN SATURATION: 96 % | RESPIRATION RATE: 20 BRPM | SYSTOLIC BLOOD PRESSURE: 126 MMHG | HEART RATE: 76 BPM

## 2023-05-22 PROCEDURE — G0299 HHS/HOSPICE OF RN EA 15 MIN: HCPCS

## 2023-05-22 ASSESSMENT — ENCOUNTER SYMPTOMS
DYSPNEA ACTIVITY LEVEL: AFTER AMBULATING 10 - 20 FT
BOWEL INCONTINENCE: 1
BOWEL INCONTINENCE: 1
DYSPNEA ACTIVITY LEVEL: AFTER AMBULATING 10 - 20 FT

## 2023-05-23 NOTE — HOME HEALTH
Problem: Urinary retention/BPH with need for chronic mcginnis catheter     Intervention: Current leg bag emptied for 200 cc hazy yellow, non-malodorous urine. Flushed catheter with 60cc sterile water. Changed leg bag and straps. Performed routine catheter care. Pt. tolerated well. No s/s of UTI. Next mcginnis catheter change due 6/13/23. Filled pt's large water bottle and gave pt. to drink. Pt. drank 100cc H2O. Pt. needs frequent reminders to drink. Goal: Mcginnis catheter will remain patent and pt. will remain free of s/s of infection.

## 2023-05-23 NOTE — HOME HEALTH
Problem: Chronic Indwelling Mcginnis catheter for urine retention    Intervention: [facility] SN 1 W 9 for monthly mcginnis catheter changes and weekly leg bag changes/flushing for patency as necessary. Cathetr is patient draining clear yellow, non-malodorous urine. Goals: Patient 's catheter will remain patient and free of s/s of UTI throughout [facility] certification period.

## 2023-05-30 ENCOUNTER — HOME CARE VISIT (OUTPATIENT)
Dept: SCHEDULING | Facility: HOME HEALTH | Age: 88
End: 2023-05-30
Payer: MEDICARE

## 2023-05-30 VITALS
RESPIRATION RATE: 20 BRPM | TEMPERATURE: 96.6 F | SYSTOLIC BLOOD PRESSURE: 116 MMHG | OXYGEN SATURATION: 94 % | DIASTOLIC BLOOD PRESSURE: 70 MMHG | HEART RATE: 72 BPM

## 2023-05-30 PROCEDURE — G0299 HHS/HOSPICE OF RN EA 15 MIN: HCPCS

## 2023-05-30 ASSESSMENT — ENCOUNTER SYMPTOMS: BOWEL INCONTINENCE: 1

## 2023-05-30 NOTE — HOME HEALTH
Problem: Chronic indwelling urinary device: mcginnis catheter for urinary retention    Intervention: Routine mcginnis catheter managent performed: Emptied current catheter bag for 75 cc clear yellow, non-malodorous urine. Catheter insertion site cleansed with soap and water, catheter irrigated with 60 mls sterile water and 60mls sterile water immediately returned. Pt. tolerated well. Leg bag changed and secured with 2 new leg straps to RLE. Catheter anchored by stat lock. Pt. given fresh cold water to drink. Goal: Mcginnis catheter will remain patent and profiiently drain bladder incurrance of any s/s of infection.

## 2023-06-06 ENCOUNTER — HOME CARE VISIT (OUTPATIENT)
Dept: SCHEDULING | Facility: HOME HEALTH | Age: 88
End: 2023-06-06
Payer: MEDICARE

## 2023-06-06 PROCEDURE — G0300 HHS/HOSPICE OF LPN EA 15 MIN: HCPCS

## 2023-06-10 VITALS
HEART RATE: 63 BPM | SYSTOLIC BLOOD PRESSURE: 136 MMHG | OXYGEN SATURATION: 92 % | TEMPERATURE: 97.1 F | DIASTOLIC BLOOD PRESSURE: 75 MMHG | RESPIRATION RATE: 18 BRPM

## 2023-06-10 ASSESSMENT — ENCOUNTER SYMPTOMS: BOWEL INCONTINENCE: 1

## 2023-06-10 NOTE — HOME HEALTH
Patient with mcginnis catheter, mobility, and cognitive impairment    Skin CDI, catheter care Provided per care plan. VSS, lungs cta, pt denies pain. Mcginnis patent draining clear yellow non-odorous urine, Leg bag changed 200ml output. Pt tolerated well. Caregiver involvement: pt lives in [facility]    Medications reconciled and all medications are available in home. Home health supplies by type and quantity ordered/delivered this visit include: n/a    Patient education provided this visit: SN educated patient and patient's caregiver on s/s of infection. Patient and patient caregiver verbalizes understanding via the teach back method. Progress toward goals: progressing well    Home exercise program:     Plan for next visit: mcginnis catheter management    The following discharge planning was discussed with the patient/ patient's caregiver: DC when goals met.

## 2023-06-22 ENCOUNTER — HOME CARE VISIT (OUTPATIENT)
Dept: HOME HEALTH SERVICES | Facility: HOME HEALTH | Age: 88
End: 2023-06-22
Payer: MEDICARE

## 2023-06-22 PROCEDURE — G0299 HHS/HOSPICE OF RN EA 15 MIN: HCPCS

## 2023-06-24 VITALS
RESPIRATION RATE: 20 BRPM | OXYGEN SATURATION: 95 % | SYSTOLIC BLOOD PRESSURE: 140 MMHG | HEART RATE: 68 BPM | DIASTOLIC BLOOD PRESSURE: 86 MMHG | TEMPERATURE: 97.2 F

## 2023-06-24 ASSESSMENT — ENCOUNTER SYMPTOMS
COUGH CHARACTERISTICS: MOIST
DYSPNEA ACTIVITY LEVEL: AFTER AMBULATING MORE THAN 20 FT
COUGH: 1
BOWEL INCONTINENCE: 1

## 2023-06-26 ENCOUNTER — HOME CARE VISIT (OUTPATIENT)
Dept: SCHEDULING | Facility: HOME HEALTH | Age: 88
End: 2023-06-26
Payer: MEDICARE

## 2023-06-26 PROCEDURE — G0299 HHS/HOSPICE OF RN EA 15 MIN: HCPCS

## 2023-06-28 VITALS
DIASTOLIC BLOOD PRESSURE: 80 MMHG | OXYGEN SATURATION: 94 % | TEMPERATURE: 96.8 F | RESPIRATION RATE: 18 BRPM | HEART RATE: 65 BPM | SYSTOLIC BLOOD PRESSURE: 140 MMHG

## 2023-06-28 ASSESSMENT — ENCOUNTER SYMPTOMS
STOOL DESCRIPTION: DIARRHEA
DYSPNEA ACTIVITY LEVEL: AFTER AMBULATING MORE THAN 20 FT
BOWEL INCONTINENCE: 1
DIARRHEA: 1

## 2023-07-03 ENCOUNTER — HOME CARE VISIT (OUTPATIENT)
Dept: SCHEDULING | Facility: HOME HEALTH | Age: 88
End: 2023-07-03
Payer: MEDICARE

## 2023-07-03 PROCEDURE — G0299 HHS/HOSPICE OF RN EA 15 MIN: HCPCS

## 2023-07-04 VITALS
RESPIRATION RATE: 18 BRPM | DIASTOLIC BLOOD PRESSURE: 80 MMHG | SYSTOLIC BLOOD PRESSURE: 138 MMHG | TEMPERATURE: 96.4 F | OXYGEN SATURATION: 96 % | HEART RATE: 79 BPM

## 2023-07-04 ASSESSMENT — ENCOUNTER SYMPTOMS
SKIN LESIONS: 1
BOWEL INCONTINENCE: 1
DYSPNEA ACTIVITY LEVEL: AFTER AMBULATING MORE THAN 20 FT

## 2023-07-04 NOTE — HOME HEALTH
Problem: Chronic Waggoner catheter urinary device for urinary retention   Intervention: Routine weekly Waggoner catheter care performed cleansing tip of catheter with disposable cleansing cloths from insertion site downward with single direction strokes. Current Waggoner leg bag emptied for 200 cc clear yellow, non-malodorous urine. Waggoner catheter flushed with 60 ml sterile water and 30 mls sterile water returned immediately. Leg bag changed and secured with new leg straps and stat lock. Pt. tolerated well. Clear yellow urine draining into new catheter bag noted. Goal: Waggoner catheter will remain patent. Pt. will remain free of s/s of infection throughout [facility] of care.

## 2023-07-11 ENCOUNTER — HOME CARE VISIT (OUTPATIENT)
Dept: SCHEDULING | Facility: HOME HEALTH | Age: 88
End: 2023-07-11
Payer: MEDICARE

## 2023-07-11 PROCEDURE — G0299 HHS/HOSPICE OF RN EA 15 MIN: HCPCS

## 2023-07-12 VITALS
RESPIRATION RATE: 18 BRPM | HEART RATE: 80 BPM | SYSTOLIC BLOOD PRESSURE: 110 MMHG | DIASTOLIC BLOOD PRESSURE: 80 MMHG | TEMPERATURE: 96.9 F

## 2023-07-12 ASSESSMENT — ENCOUNTER SYMPTOMS
BOWEL INCONTINENCE: 1
DYSPNEA ACTIVITY LEVEL: AFTER AMBULATING MORE THAN 20 FT

## 2023-07-12 NOTE — HOME HEALTH
Problem: Chronic Waggoner catheter urinary device for urinary retention   Intervention: Routine weekly Waggoner catheter care performed cleansing tip of catheter with disposable cleansing cloths from insertion site downward with single direction strokes. Current Waggoner leg bag emptied for 175 cc clear yellow, non-malodorous urine. Waggoner catheter flushed with 60 ml sterile water and 60 mls sterile water returned immediately. Leg bag changed and secured with new leg straps and stat lock. Pt. tolerated well. Clear yellow urine draining into new catheter bag noted. Goal: Waggoner catheter will remain patent. Pt. will remain free of s/s of infection throughout [facility] of care.

## 2023-07-18 ENCOUNTER — HOME CARE VISIT (OUTPATIENT)
Dept: HOME HEALTH SERVICES | Facility: HOME HEALTH | Age: 88
End: 2023-07-18

## 2023-07-18 PROCEDURE — G0299 HHS/HOSPICE OF RN EA 15 MIN: HCPCS

## 2023-07-20 VITALS
TEMPERATURE: 96.9 F | DIASTOLIC BLOOD PRESSURE: 78 MMHG | RESPIRATION RATE: 20 BRPM | OXYGEN SATURATION: 93 % | HEART RATE: 71 BPM | SYSTOLIC BLOOD PRESSURE: 146 MMHG

## 2023-07-20 ASSESSMENT — ENCOUNTER SYMPTOMS
BOWEL INCONTINENCE: 1
DYSPNEA ACTIVITY LEVEL: AFTER AMBULATING LESS THAN 10 FT
STOOL DESCRIPTION: DIARRHEA

## 2023-07-20 NOTE — HOME HEALTH
Problem: Chronic Waggoner catheter urinary device for urinary retention   Intervention: Routine monthly Waggoner catheter care performed. Current catheter emptied of 125 cc clear yellow, non-malodorous urine. Baloon deflated of 30 ml water and discontinued without localized trauma. New 18 Cypriot, 30 ml baloon, 2-way Waggoner catheter inserted via sterile technique with first attempt. Pt. tolerated well. 30 mls lear yellow, non-malodorous urine immediately returned. New leg bag with two new securing straps and new STAT lock placed to secure catheter to pt's RLE. Straps checked for tightness with SN ability to place two fingers under leg straps. Goal: Waggoner catheter will remain patent. Pt. will remain free of s/s of infection throughout [address] [facility].

## 2023-07-25 ENCOUNTER — HOME CARE VISIT (OUTPATIENT)
Dept: SCHEDULING | Facility: HOME HEALTH | Age: 88
End: 2023-07-25

## 2023-07-25 PROCEDURE — G0299 HHS/HOSPICE OF RN EA 15 MIN: HCPCS

## 2023-07-26 VITALS
OXYGEN SATURATION: 94 % | HEART RATE: 78 BPM | SYSTOLIC BLOOD PRESSURE: 130 MMHG | DIASTOLIC BLOOD PRESSURE: 92 MMHG | TEMPERATURE: 96.4 F

## 2023-07-26 ASSESSMENT — ENCOUNTER SYMPTOMS
BOWEL INCONTINENCE: 1
DYSPNEA ACTIVITY LEVEL: AFTER AMBULATING MORE THAN 20 FT

## 2023-07-26 NOTE — HOME HEALTH
Problem: Chronic Waggoner catheter urinary device for urinary retention   Intervention: Routine weekly Waggoner catheter care performed. Current catheter emptied of  125  cc clear yellow, non-malodorous urine. Catheter irrigated with 100 cc sterile water with 100 cc sterile water immediate return. Pt. tolerated well. New leg bag with two new securing straps placed to secure catheter to pt's RLE. Straps checked for tightness with SN ability to place two fingers under leg straps. Catheter insertion site / meatus with soapy bath wipes. Goal: Waggoner catheter will remain patent. Pt. will remain free of s/s of infection throughout [address] [facility].

## 2023-08-01 ENCOUNTER — HOME CARE VISIT (OUTPATIENT)
Dept: SCHEDULING | Facility: HOME HEALTH | Age: 88
End: 2023-08-01
Payer: MEDICARE

## 2023-08-01 VITALS
DIASTOLIC BLOOD PRESSURE: 88 MMHG | HEART RATE: 69 BPM | SYSTOLIC BLOOD PRESSURE: 138 MMHG | OXYGEN SATURATION: 97 % | RESPIRATION RATE: 20 BRPM | TEMPERATURE: 96.6 F

## 2023-08-01 PROCEDURE — G0299 HHS/HOSPICE OF RN EA 15 MIN: HCPCS

## 2023-08-01 ASSESSMENT — ENCOUNTER SYMPTOMS: DYSPNEA ACTIVITY LEVEL: AFTER AMBULATING MORE THAN 20 FT

## 2023-08-03 ASSESSMENT — ENCOUNTER SYMPTOMS: BOWEL INCONTINENCE: 1

## 2023-08-03 NOTE — HOME HEALTH
Problem: Chronic Waggoner catheter urinary device for urinary retention, COPD     Intervention: Routine weekly Waggoner catheter care performed. Current catheter emptied of 200 cc clear yellow, non-malodorous urine. Catheter irrigated with 100 cc Normal Saline with 100 cc Normal Saline immediately returned. Pt. tolerated well. New leg bag with two new securing straps placed to secure catheter to pt's RLE. Straps checked for tightness with SN ability to place two fingers under leg straps. Catheter insertion site / meatus with soapy bath wipes. Pt. given fresh new water to maintain hydration. Pt. is cooperative and drinks about 200 mls water. Pt's LS with wheezes in all lung fields. Pt. with obvious SOB at rest. O2 sat. initially 90% on R/A, yet pt. denies respiratory distress. Coughing and deep breathing excercises performed with pt. who does a good return demonstration. O2 sat increased to 97% on r/a. Informed [facility] med tech of pt's need for PRN Nebulizer Tx. Goal: Waggoner catheter will remain patent. Pt. will remain free of s/s of infection throughout [address] [facility].

## 2023-08-09 ENCOUNTER — HOME CARE VISIT (OUTPATIENT)
Dept: SCHEDULING | Facility: HOME HEALTH | Age: 88
End: 2023-08-09
Payer: MEDICARE

## 2023-08-09 PROCEDURE — G0300 HHS/HOSPICE OF LPN EA 15 MIN: HCPCS

## 2023-08-14 ENCOUNTER — HOME CARE VISIT (OUTPATIENT)
Dept: HOME HEALTH SERVICES | Facility: HOME HEALTH | Age: 88
End: 2023-08-14
Payer: MEDICARE

## 2023-08-15 ENCOUNTER — HOME CARE VISIT (OUTPATIENT)
Dept: HOME HEALTH SERVICES | Facility: HOME HEALTH | Age: 88
End: 2023-08-15
Payer: MEDICARE

## 2023-08-16 VITALS
HEART RATE: 70 BPM | DIASTOLIC BLOOD PRESSURE: 73 MMHG | RESPIRATION RATE: 18 BRPM | TEMPERATURE: 97.5 F | SYSTOLIC BLOOD PRESSURE: 127 MMHG | OXYGEN SATURATION: 93 %

## 2023-08-16 ASSESSMENT — ENCOUNTER SYMPTOMS: BOWEL INCONTINENCE: 1

## 2023-08-16 NOTE — HOME HEALTH
Patient with mcginnis catheter, mobility, and cognitive impairment    Skin CDI, catheter care Provided per care plan. VSS, lungs cta, pt denies pain. Mcginnis patent draining clear yellow non-odorous urine, Leg bag changed 50ml output. Pt tolerated well. Caregiver involvement: pt lives in assisted    Medications reconciled and all medications are available in home. Home health supplies by type and quantity ordered/delivered this visit include: n/a    Patient education provided this visit: SN educated patient and patient's caregiver on s/s of infection. Patient and patient caregiver verbalizes understanding via the teach back method. Progress toward goals: progressing well    Home exercise program:     Plan for next visit: mcginnis catheter management    The following discharge planning was discussed with the patient/ patient's caregiver: DC when goals met.

## 2023-10-16 ENCOUNTER — HOME HEALTH ADMISSION (OUTPATIENT)
Dept: HOME HEALTH SERVICES | Facility: HOME HEALTH | Age: 88
End: 2023-10-16
Payer: MEDICARE

## 2023-10-17 ENCOUNTER — HOME CARE VISIT (OUTPATIENT)
Dept: SCHEDULING | Facility: HOME HEALTH | Age: 88
End: 2023-10-17

## 2023-10-17 VITALS
TEMPERATURE: 97.5 F | OXYGEN SATURATION: 92 % | HEART RATE: 70 BPM | SYSTOLIC BLOOD PRESSURE: 131 MMHG | DIASTOLIC BLOOD PRESSURE: 42 MMHG | RESPIRATION RATE: 16 BRPM

## 2023-10-17 PROCEDURE — 0221000100 HH NO PAY CLAIM PROCEDURE

## 2023-10-17 PROCEDURE — G0299 HHS/HOSPICE OF RN EA 15 MIN: HCPCS

## 2023-10-17 ASSESSMENT — ENCOUNTER SYMPTOMS
DYSPNEA ACTIVITY LEVEL: AFTER AMBULATING MORE THAN 20 FT
BOWEL INCONTINENCE: 1

## 2023-10-18 ENCOUNTER — HOME CARE VISIT (OUTPATIENT)
Dept: HOME HEALTH SERVICES | Facility: HOME HEALTH | Age: 88
End: 2023-10-18

## 2023-10-18 VITALS
DIASTOLIC BLOOD PRESSURE: 70 MMHG | HEART RATE: 70 BPM | OXYGEN SATURATION: 93 % | SYSTOLIC BLOOD PRESSURE: 106 MMHG | RESPIRATION RATE: 16 BRPM | TEMPERATURE: 97.1 F

## 2023-10-18 PROCEDURE — G0151 HHCP-SERV OF PT,EA 15 MIN: HCPCS

## 2023-10-18 PROCEDURE — G0152 HHCP-SERV OF OT,EA 15 MIN: HCPCS

## 2023-10-18 NOTE — HOME HEALTH
BLE seated therex: AROM/AROM against slight resistance to increase motor control and strength for mobility, hip flex/abd/add, knee flex/ext, and ankle PF/DF; 3x10. Anterior/posterior trunk weight shifts in W/C against slight resistance to increase core stability and to prepare for transfers; 3-5 reps x3. Left and right side-steps in standing with handrail and Jeremiah to increase gross motor coordination and BLE weight acceptance for tasks; 10ft x4. W/C to chair 4x with Jeremiah and no AD. Verbal and tactile instruction for proper motor sequencing to improve stability and to decrease risk of falls. Target reach and grab in standing with handrail and Jeremiah to improve dynamic standing balance when reaching for items during ADLs; PT facilitated minimal trunk excursions; 30-40 sec. x3.

## 2023-10-19 ENCOUNTER — HOME CARE VISIT (OUTPATIENT)
Dept: SCHEDULING | Facility: HOME HEALTH | Age: 88
End: 2023-10-19

## 2023-10-19 VITALS
TEMPERATURE: 97.3 F | HEART RATE: 70 BPM | DIASTOLIC BLOOD PRESSURE: 61 MMHG | SYSTOLIC BLOOD PRESSURE: 116 MMHG | RESPIRATION RATE: 16 BRPM | OXYGEN SATURATION: 92 %

## 2023-10-19 PROCEDURE — G0493 RN CARE EA 15 MIN HH/HOSPICE: HCPCS

## 2023-10-19 ASSESSMENT — ENCOUNTER SYMPTOMS
DYSPNEA ACTIVITY LEVEL: AFTER AMBULATING MORE THAN 20 FT
BOWEL INCONTINENCE: 1

## 2023-10-19 NOTE — HOME HEALTH
Patient with Left acetabulum fracture, indwelling catheter. Caregiver involvement: resides in memory care unit of [facility]    Patient education provided this visit: SN educated patient and patient's caregiver on call us first protocol Patient and patient caregiver verbalizes understanding via the teach back method.  Progress toward goals: progressing well client alert, oriented to person, vital signs were within normal limits, no pain noted, lung sounds were clear, no cough, indwelling catheter 16fr with 10ml balloon patent, incontinent of bowel, skin intact no edema ntoed, fall precautions maintained   Home exercise program: PT/OT to eval and treat   Plan for next visit: mcginnis care/maintainence   discharge planning was discussed with the patient/ patient's caregiver: DC when goals met

## 2023-10-20 VITALS — RESPIRATION RATE: 17 BRPM | SYSTOLIC BLOOD PRESSURE: 93 MMHG | DIASTOLIC BLOOD PRESSURE: 64 MMHG | HEART RATE: 71 BPM

## 2023-10-20 NOTE — HOME HEALTH
Patient is a 79 y/o man who has returned to his [facility] snf setting after falling with resulting hip fracture. He received skilled services in SNF setting , and now continues to reuire assist for functional tasks. Previously he ambulated at MI level with in snf, Currently his primary mode of transportation is w/c. His initial OT evaluation was completed, and patient could benefit from continued skilled services.

## 2023-10-23 ENCOUNTER — HOME CARE VISIT (OUTPATIENT)
Dept: SCHEDULING | Facility: HOME HEALTH | Age: 88
End: 2023-10-23
Payer: MEDICARE

## 2023-10-23 VITALS
SYSTOLIC BLOOD PRESSURE: 128 MMHG | OXYGEN SATURATION: 93 % | RESPIRATION RATE: 16 BRPM | HEART RATE: 60 BPM | DIASTOLIC BLOOD PRESSURE: 66 MMHG | TEMPERATURE: 98.6 F

## 2023-10-23 PROCEDURE — G0493 RN CARE EA 15 MIN HH/HOSPICE: HCPCS

## 2023-10-23 ASSESSMENT — ENCOUNTER SYMPTOMS
DYSPNEA ACTIVITY LEVEL: AFTER AMBULATING MORE THAN 20 FT
BOWEL INCONTINENCE: 1

## 2023-10-23 NOTE — HOME HEALTH
Patient with Left acetabulum fracture, indwelling catheter. Caregiver involvement: resides in memory care unit of [facility]   Patient education provided this visit: SN educated patient and patient's caregiver on UTI prevention Patient and patient caregiver verbalizes understanding via the teach back method.    Progress toward goals: progressing well client alert, oriented to person,patient combative upon arrival, SN had aide, and DON present during visit, vital signs were within normal limits, no pain noted, lung sounds were clear, no cough, indwelling catheter 16fr with 10ml balloon leaking, DC per order, New indwelling catheter placed 18fr with 30ml balloon patent straw hazy urine returned, UA C7S collected per DON direction, incontinent of bowel, skin intact no edema ntoed, fall precautions maintained   Plan for next visit: mcginnis care/maintainence   discharge planning was discussed with the patient/ patient's caregiver: DC when goals met

## 2023-10-24 ENCOUNTER — HOME CARE VISIT (OUTPATIENT)
Dept: SCHEDULING | Facility: HOME HEALTH | Age: 88
End: 2023-10-24
Payer: MEDICARE

## 2023-10-24 VITALS
RESPIRATION RATE: 17 BRPM | TEMPERATURE: 98.5 F | SYSTOLIC BLOOD PRESSURE: 125 MMHG | HEART RATE: 60 BPM | DIASTOLIC BLOOD PRESSURE: 62 MMHG | OXYGEN SATURATION: 93 %

## 2023-10-24 PROCEDURE — G0152 HHCP-SERV OF OT,EA 15 MIN: HCPCS

## 2023-10-24 NOTE — HOME HEALTH
Resident actively participated in BUE movement  and exercises to improve increase ROM and strength for functional tasks. He benefits from cues to redirect and focus on tasks reuested of him. He c/o freuently of fatiue, as well as inappropriate conversation many times. He can be redirected. Patient does participate in UB self-care with minimal assist, once initiated he will follow thru. UP to mod-max assist for LB ADL's. Including standing from w/c with c/o faitue.

## 2023-10-25 ENCOUNTER — HOME CARE VISIT (OUTPATIENT)
Dept: HOME HEALTH SERVICES | Facility: HOME HEALTH | Age: 88
End: 2023-10-25
Payer: MEDICARE

## 2023-10-25 PROCEDURE — G0157 HHC PT ASSISTANT EA 15: HCPCS

## 2023-10-26 ENCOUNTER — HOME CARE VISIT (OUTPATIENT)
Dept: HOME HEALTH SERVICES | Facility: HOME HEALTH | Age: 88
End: 2023-10-26
Payer: MEDICARE

## 2023-10-26 PROCEDURE — G0152 HHCP-SERV OF OT,EA 15 MIN: HCPCS

## 2023-10-27 ENCOUNTER — HOME CARE VISIT (OUTPATIENT)
Dept: HOME HEALTH SERVICES | Facility: HOME HEALTH | Age: 88
End: 2023-10-27
Payer: MEDICARE

## 2023-10-27 VITALS
SYSTOLIC BLOOD PRESSURE: 116 MMHG | DIASTOLIC BLOOD PRESSURE: 78 MMHG | OXYGEN SATURATION: 94 % | HEART RATE: 68 BPM | TEMPERATURE: 97.4 F | RESPIRATION RATE: 17 BRPM

## 2023-10-27 VITALS
HEART RATE: 72 BPM | OXYGEN SATURATION: 94 % | RESPIRATION RATE: 17 BRPM | DIASTOLIC BLOOD PRESSURE: 71 MMHG | SYSTOLIC BLOOD PRESSURE: 110 MMHG | TEMPERATURE: 97.2 F

## 2023-10-27 PROCEDURE — G0151 HHCP-SERV OF PT,EA 15 MIN: HCPCS

## 2023-10-27 NOTE — HOME HEALTH
Left/right side-stepping in standing with railing and CGA to improve ability to navigate safely during ADLs with caregiver. Forward steps in standing with handrail and CGA to improve BLE weight acceptance and postural control for functional tasks; 20ft x2. BLE seated therex: AAROM/AROM with minimal overpressure to increase tissue extensibility and motor control for functional mobility, hip flex/abd/add, knee flex/ext, and ankle PF/DF; 3x10. Sit to stands from W/C with handrail and CGA; 3 stands x3. Pt. education and demo to reach back for arm-rests of W/C to control descent to sitting and to reduce risk of falls.

## 2023-10-27 NOTE — HOME HEALTH
Mod assist to transition patient from supine to sitting EOB. After requesting to sit for a minute mod assist to transfer to w/c from edge of bed. Patient demonstrated ability to move w/c, which is primary mode of transportation at this time. patient's hands placed on wheels of w/c to encourage BUE strengthening as well as mobility. Paatient actively participated in BUE exercises with 2 pounds of resistance. Mod assist with UB dressing skills, cues to redirect to stay on task.

## 2023-10-30 ENCOUNTER — HOME CARE VISIT (OUTPATIENT)
Dept: SCHEDULING | Facility: HOME HEALTH | Age: 88
End: 2023-10-30
Payer: MEDICARE

## 2023-10-30 PROCEDURE — G0152 HHCP-SERV OF OT,EA 15 MIN: HCPCS

## 2023-10-31 ENCOUNTER — HOME CARE VISIT (OUTPATIENT)
Dept: SCHEDULING | Facility: HOME HEALTH | Age: 88
End: 2023-10-31
Payer: MEDICARE

## 2023-10-31 ENCOUNTER — HOME CARE VISIT (OUTPATIENT)
Dept: HOME HEALTH SERVICES | Facility: HOME HEALTH | Age: 88
End: 2023-10-31
Payer: MEDICARE

## 2023-10-31 VITALS
SYSTOLIC BLOOD PRESSURE: 118 MMHG | RESPIRATION RATE: 18 BRPM | HEART RATE: 63 BPM | TEMPERATURE: 97.2 F | DIASTOLIC BLOOD PRESSURE: 76 MMHG | OXYGEN SATURATION: 93 %

## 2023-10-31 VITALS
HEART RATE: 67 BPM | DIASTOLIC BLOOD PRESSURE: 70 MMHG | OXYGEN SATURATION: 91 % | RESPIRATION RATE: 16 BRPM | SYSTOLIC BLOOD PRESSURE: 119 MMHG | TEMPERATURE: 98.2 F

## 2023-10-31 VITALS
RESPIRATION RATE: 17 BRPM | TEMPERATURE: 97.2 F | DIASTOLIC BLOOD PRESSURE: 68 MMHG | SYSTOLIC BLOOD PRESSURE: 109 MMHG | HEART RATE: 70 BPM

## 2023-10-31 PROCEDURE — G0151 HHCP-SERV OF PT,EA 15 MIN: HCPCS

## 2023-10-31 PROCEDURE — G0299 HHS/HOSPICE OF RN EA 15 MIN: HCPCS

## 2023-10-31 ASSESSMENT — ENCOUNTER SYMPTOMS
PAIN LOCATION - PAIN QUALITY: DULL
DYSPNEA ACTIVITY LEVEL: AFTER AMBULATING MORE THAN 20 FT
BOWEL INCONTINENCE: 1

## 2023-10-31 NOTE — HOME HEALTH
Gait training with ModA/Jeremiah and no AD on level surfaces; close W/C follow; 40ft x1, and 20ft x1. Verbal and visual instruction to maintain an upright posture and to visually scan the environment for potential hazards such as: thresholds, other residents, or furniture to improve safety awareness in a dynamic environment. BLE seated therex: AROM/AROM against slight resistance to increase motor control and strength for mobility, hip flex/abd/add, knee flex/ext, and ankle PF/DF; 3x10. Sit to stands from chair with Jeremiah and handrail; 4 stands. Verbal and tactile instruction for proper weight shifts over COG to reduce risk of falls.

## 2023-10-31 NOTE — HOME HEALTH
Patient with indwelling catheter, traumatic skin injury to right lower extremity. Caregiver involvement: resides in memory care unit of [facility]   Patient education provided this visit: SN educated patient and patient's caregiver on signs anf symtpoms of infection Patient and patient caregiver verbalizes understanding via the teach back method.    Progress toward goals: progressing well client alert, oriented to person, vital signs were within normal limits, no pain noted, lung sounds were clear, no cough, indwelling catherter patent, no complaints, incontinent of bowel, traumatic injury to right lower extrenity 2.5x2.5x0.1 trace edema noted to right lower extremity, fall precautions maintained   Plan for next visit: mcginnis care/maintainence, wound care   discharge planning was discussed with the patient/ patient's caregiver: DC when goals met

## 2023-10-31 NOTE — HOME HEALTH
OT addressed grooming and hygiene tasks, verbal, visual, and tactile cues beneficial for active participation in tasks. Patient demonstrated ability to reach and place objects of interst w/c level at sink,  min assist with UB dressing skill, moderate with LB self-care. LB dressing not attempted. Patient actively participated in [address] [place] [address] educated on UE exercises.

## 2023-11-02 ENCOUNTER — HOME CARE VISIT (OUTPATIENT)
Dept: HOME HEALTH SERVICES | Facility: HOME HEALTH | Age: 88
End: 2023-11-02
Payer: MEDICARE

## 2023-11-02 VITALS
TEMPERATURE: 97.3 F | DIASTOLIC BLOOD PRESSURE: 84 MMHG | SYSTOLIC BLOOD PRESSURE: 128 MMHG | OXYGEN SATURATION: 93 % | RESPIRATION RATE: 17 BRPM | HEART RATE: 68 BPM

## 2023-11-02 PROCEDURE — G0151 HHCP-SERV OF PT,EA 15 MIN: HCPCS

## 2023-11-02 NOTE — HOME HEALTH
BLE seated therex: AROM/AROM against slight to minimal resistance to increase motor control and strength for mobility; hip flex/abd/add, knee flex/ext, and ankle PF/DF; 3x10. Pt. required consistent verbal and tactile instruction for proper body mechanics during therex to improve muscle length-tension relationship and to reduce risk of injury. W/C to chair 4x with 2WW and CGA. Sit to stands from W/C with 2WW and CGA/SBA; 7 stands. Verbal and tactile instruction for proper hand/foot placements and for safe weight shifts over COG to reduce risk of falls. Gait training with 2WW and CGA on level surfaces; 55ft x1, 30ft x1, and 60ft x1. Verbal and tactile instruction to maintain an upright posture and to scan the environment for hazards such as: thresholds, or furniture to improve safety awareness throughout the facility.

## 2023-11-03 ENCOUNTER — HOME CARE VISIT (OUTPATIENT)
Dept: SCHEDULING | Facility: HOME HEALTH | Age: 88
End: 2023-11-03
Payer: MEDICARE

## 2023-11-03 PROCEDURE — G0152 HHCP-SERV OF OT,EA 15 MIN: HCPCS

## 2023-11-03 PROCEDURE — G0300 HHS/HOSPICE OF LPN EA 15 MIN: HCPCS

## 2023-11-03 NOTE — HOME HEALTH
OT  has addressed self-care performance  with focus on UB dressing, some grooming and hygiene. Patient benefits from ques to initiate and follow thru to task completion. Patient has demonstrated ability to dress UB, when given enough time. He will actively participate in [address] some redirection back to task.

## 2023-11-04 VITALS
SYSTOLIC BLOOD PRESSURE: 112 MMHG | HEART RATE: 52 BPM | DIASTOLIC BLOOD PRESSURE: 66 MMHG | OXYGEN SATURATION: 93 % | TEMPERATURE: 97.8 F | RESPIRATION RATE: 18 BRPM

## 2023-11-04 ASSESSMENT — ENCOUNTER SYMPTOMS: BOWEL INCONTINENCE: 1

## 2023-11-05 VITALS
TEMPERATURE: 97.3 F | RESPIRATION RATE: 17 BRPM | SYSTOLIC BLOOD PRESSURE: 113 MMHG | HEART RATE: 72 BPM | DIASTOLIC BLOOD PRESSURE: 71 MMHG

## 2023-11-06 ENCOUNTER — HOME CARE VISIT (OUTPATIENT)
Dept: SCHEDULING | Facility: HOME HEALTH | Age: 88
End: 2023-11-06
Payer: MEDICARE

## 2023-11-06 VITALS
HEART RATE: 62 BPM | TEMPERATURE: 97.5 F | OXYGEN SATURATION: 92 % | RESPIRATION RATE: 18 BRPM | SYSTOLIC BLOOD PRESSURE: 138 MMHG | DIASTOLIC BLOOD PRESSURE: 85 MMHG

## 2023-11-06 PROCEDURE — G0300 HHS/HOSPICE OF LPN EA 15 MIN: HCPCS

## 2023-11-06 ASSESSMENT — ENCOUNTER SYMPTOMS: BOWEL INCONTINENCE: 1

## 2023-11-06 NOTE — HOME HEALTH
Patient with indwelling catheter, traumatic wound to Right lower extremity   Wound Care to RLE Provided per care plan. Pt tolerated well. Pt denies pain at this time. Caregiver involvement: patient lives in [facility]/U with 24/7 assistance available   Medications reconciled and all medications are available in home. Home health supplies by type and quantity ordered/delivered this visit include: mcginnis leg bags   Patient education provided this visit: SN educated patient and patient's caregiver on energy conservation. Patient and patient caregiver verbalizes understanding via the teach back method. Progress toward goals: progressing well   Home exercise program:   Plan for next visit: sn wound care  The following discharge planning was discussed with the patient/ patient's caregiver: DC when goals met.

## 2023-11-09 ENCOUNTER — HOME CARE VISIT (OUTPATIENT)
Dept: SCHEDULING | Facility: HOME HEALTH | Age: 88
End: 2023-11-09
Payer: MEDICARE

## 2023-11-09 VITALS
HEART RATE: 65 BPM | OXYGEN SATURATION: 97 % | DIASTOLIC BLOOD PRESSURE: 68 MMHG | TEMPERATURE: 97.7 F | SYSTOLIC BLOOD PRESSURE: 132 MMHG | RESPIRATION RATE: 18 BRPM

## 2023-11-09 PROCEDURE — G0300 HHS/HOSPICE OF LPN EA 15 MIN: HCPCS

## 2023-11-09 ASSESSMENT — ENCOUNTER SYMPTOMS: BOWEL INCONTINENCE: 1

## 2023-11-10 NOTE — HOME HEALTH
Patient with indwelling catheter, traumatic wound to Right lower extremity     Wound Care to RLE Provided per care plan. Pt tolerated well. Pt denies pain at this time. Caregiver involvement: patient lives in [facility]/U with 24/7 assistance available     Medications reconciled and all medications are available in home. Home health supplies by type and quantity ordered/delivered this visit include: mcginnis leg bags     Patient education provided this visit: SN educated patient and patient's caregiver on fall prevention. Patient and patient caregiver verbalizes understanding via the teach back method. Progress toward goals: progressing well     Home exercise program:     Plan for next visit: sn wound care    The following discharge planning was discussed with the patient/ patient's caregiver: DC when goals met.

## 2023-11-13 ENCOUNTER — HOME CARE VISIT (OUTPATIENT)
Dept: HOME HEALTH SERVICES | Facility: HOME HEALTH | Age: 88
End: 2023-11-13
Payer: MEDICARE

## 2023-11-13 VITALS
OXYGEN SATURATION: 94 % | RESPIRATION RATE: 18 BRPM | TEMPERATURE: 97.3 F | DIASTOLIC BLOOD PRESSURE: 80 MMHG | SYSTOLIC BLOOD PRESSURE: 130 MMHG | HEART RATE: 65 BPM

## 2023-11-13 PROCEDURE — G0299 HHS/HOSPICE OF RN EA 15 MIN: HCPCS

## 2023-11-13 ASSESSMENT — ENCOUNTER SYMPTOMS: DYSPNEA ACTIVITY LEVEL: AFTER AMBULATING MORE THAN 20 FT

## 2023-11-13 NOTE — HOME HEALTH
Problem:new tramatic wound to right elbow  Caregiver involvement: [facility] staff assist with medications and ADLs. Intervention: assessment of wound and wound care provided. Medications reconciled and all medications are available in the [facility] this visit. Wound care: wound cleansed with normal saline, petrolium gauze applied, gauze, and coban applied. wound measures 2x2 and 1.5x1.5.  slight bleeding, wound bed pink. Home health supplies ordered this visit include: NA  Patient education provided this visit:   Medication management, taking medications as prescribed as managed by [facility] staff. Infection control and prevention measures, S/S of infection,  Home safety, fall precautions   S/S to report to nurse, therapy clinician, [facility] staff, physician, and that necessitate calling emergency personnel   Diet/Nutrition   Patient/caregiver response: Verbalizes understanding via the teach back method. Progress toward goals: yes, see care plan documentation   Home exercise program: Yes  Plan for next home care visit: wound care   discharge planning was discussed with the patient/caregiver: home care agency discharge to be completed when goals met. wounds healed, Patient/caregiver  in agreement.

## 2023-11-14 ENCOUNTER — HOME CARE VISIT (OUTPATIENT)
Dept: HOME HEALTH SERVICES | Facility: HOME HEALTH | Age: 88
End: 2023-11-14
Payer: MEDICARE

## 2023-11-16 ENCOUNTER — HOME CARE VISIT (OUTPATIENT)
Dept: SCHEDULING | Facility: HOME HEALTH | Age: 88
End: 2023-11-16
Payer: MEDICARE

## 2023-11-16 VITALS
SYSTOLIC BLOOD PRESSURE: 109 MMHG | TEMPERATURE: 98.4 F | RESPIRATION RATE: 16 BRPM | OXYGEN SATURATION: 90 % | HEART RATE: 61 BPM | DIASTOLIC BLOOD PRESSURE: 64 MMHG

## 2023-11-16 PROCEDURE — G0299 HHS/HOSPICE OF RN EA 15 MIN: HCPCS

## 2023-11-16 ASSESSMENT — ENCOUNTER SYMPTOMS
BOWEL INCONTINENCE: 1
DYSPNEA ACTIVITY LEVEL: AFTER AMBULATING MORE THAN 20 FT

## 2023-11-16 NOTE — HOME HEALTH
Patient with wound to bilateral upper extremities, indwelling catheter. Caregiver involvement: resides in memory care unit of [facility]   Patient education provided this visit: SN educated patient and patient's caregiver on fall precautions Patient and patient caregiver verbalizes understanding via the teach back method.    Progress toward goals: progressing well client alert, oriented to person,vital signs were within normal limits, no pain noted, lung sounds were clear, no cough, indwelling catheter 16fr with 10ml balloon leaking, incontinent of bowel, skin intact no edema ntoed, fall precautions maintained, wound care to bilateral upper extremities provided, scant serous drainage noted, no signs or symptoms of infection noted, patient tolerated well   Plan for next visit: wound care, mcginnis change   discharge planning was discussed with the patient/ patient's caregiver: DC when goals met

## 2023-11-20 ENCOUNTER — HOME CARE VISIT (OUTPATIENT)
Dept: SCHEDULING | Facility: HOME HEALTH | Age: 88
End: 2023-11-20
Payer: MEDICARE

## 2023-11-20 VITALS
HEART RATE: 76 BPM | RESPIRATION RATE: 18 BRPM | TEMPERATURE: 97.8 F | SYSTOLIC BLOOD PRESSURE: 144 MMHG | DIASTOLIC BLOOD PRESSURE: 68 MMHG | OXYGEN SATURATION: 94 %

## 2023-11-20 PROCEDURE — G0299 HHS/HOSPICE OF RN EA 15 MIN: HCPCS

## 2023-11-20 ASSESSMENT — ENCOUNTER SYMPTOMS: BOWEL INCONTINENCE: 1

## 2023-11-22 ENCOUNTER — HOME CARE VISIT (OUTPATIENT)
Dept: SCHEDULING | Facility: HOME HEALTH | Age: 88
End: 2023-11-22
Payer: MEDICARE

## 2023-11-22 ENCOUNTER — HOME CARE VISIT (OUTPATIENT)
Dept: HOME HEALTH SERVICES | Facility: HOME HEALTH | Age: 88
End: 2023-11-22
Payer: MEDICARE

## 2023-11-22 VITALS
RESPIRATION RATE: 16 BRPM | TEMPERATURE: 97.8 F | SYSTOLIC BLOOD PRESSURE: 108 MMHG | DIASTOLIC BLOOD PRESSURE: 63 MMHG | HEART RATE: 63 BPM | OXYGEN SATURATION: 97 %

## 2023-11-22 PROCEDURE — G0299 HHS/HOSPICE OF RN EA 15 MIN: HCPCS

## 2023-11-22 ASSESSMENT — ENCOUNTER SYMPTOMS: DYSPNEA ACTIVITY LEVEL: AFTER AMBULATING MORE THAN 20 FT

## 2023-11-22 NOTE — HOME HEALTH
Problem: Pt with no urinary output in past 12 hours despite indwelling urinary cathiter which is leaking at insertion site, pts urine is foul smelling, cloudy and tea colored    Interventions: After current catheter repositioned and patency assessed it was found to have an occlusion, Waggoner catheter was exchanged in sterile technique, resulting in 400ml of tea colored, foul smelling and cloudy urine, Bladder reassessed and distention was relieved with this treatment  Clean catch urine was obtained, PCP called for an order which was granted by Dr Latoya Stuart (pending UA results)    Goals: Pt will maintain patency of catheter, pt will remain free of UTI for the remainder of this episode

## 2023-11-23 VITALS
TEMPERATURE: 98 F | DIASTOLIC BLOOD PRESSURE: 70 MMHG | OXYGEN SATURATION: 93 % | SYSTOLIC BLOOD PRESSURE: 120 MMHG | RESPIRATION RATE: 18 BRPM | HEART RATE: 60 BPM

## 2023-11-23 ASSESSMENT — ENCOUNTER SYMPTOMS
DYSPNEA ACTIVITY LEVEL: AFTER AMBULATING MORE THAN 20 FT
PAIN LOCATION - PAIN QUALITY: DUL
BOWEL INCONTINENCE: 1